# Patient Record
Sex: MALE | Race: WHITE | NOT HISPANIC OR LATINO | Employment: FULL TIME | URBAN - METROPOLITAN AREA
[De-identification: names, ages, dates, MRNs, and addresses within clinical notes are randomized per-mention and may not be internally consistent; named-entity substitution may affect disease eponyms.]

---

## 2024-09-15 ENCOUNTER — HOSPITAL ENCOUNTER (EMERGENCY)
Facility: HOSPITAL | Age: 34
Discharge: HOME | End: 2024-09-15
Attending: EMERGENCY MEDICINE

## 2024-09-15 VITALS
TEMPERATURE: 98.3 F | DIASTOLIC BLOOD PRESSURE: 75 MMHG | OXYGEN SATURATION: 98 % | HEART RATE: 79 BPM | SYSTOLIC BLOOD PRESSURE: 151 MMHG | RESPIRATION RATE: 18 BRPM

## 2024-09-15 DIAGNOSIS — K62.5 RECTAL BLEEDING: Primary | ICD-10-CM

## 2024-09-15 LAB
ANION GAP SERPL CALCULATED.3IONS-SCNC: 9 MMOL/L (ref 4–13)
BASOPHILS # BLD AUTO: 0.03 THOUSANDS/ΜL (ref 0–0.1)
BASOPHILS NFR BLD AUTO: 1 % (ref 0–1)
BUN SERPL-MCNC: 12 MG/DL (ref 5–25)
CALCIUM SERPL-MCNC: 9.1 MG/DL (ref 8.4–10.2)
CHLORIDE SERPL-SCNC: 105 MMOL/L (ref 96–108)
CO2 SERPL-SCNC: 26 MMOL/L (ref 21–32)
CREAT SERPL-MCNC: 0.86 MG/DL (ref 0.6–1.3)
EOSINOPHIL # BLD AUTO: 0.11 THOUSAND/ΜL (ref 0–0.61)
EOSINOPHIL NFR BLD AUTO: 2 % (ref 0–6)
ERYTHROCYTE [DISTWIDTH] IN BLOOD BY AUTOMATED COUNT: 17.5 % (ref 11.6–15.1)
GFR SERPL CREATININE-BSD FRML MDRD: 112 ML/MIN/1.73SQ M
GLUCOSE SERPL-MCNC: 109 MG/DL (ref 65–140)
HCT VFR BLD AUTO: 37.3 % (ref 36.5–49.3)
HGB BLD-MCNC: 10.9 G/DL (ref 12–17)
IMM GRANULOCYTES # BLD AUTO: 0.02 THOUSAND/UL (ref 0–0.2)
IMM GRANULOCYTES NFR BLD AUTO: 0 % (ref 0–2)
LYMPHOCYTES # BLD AUTO: 1.3 THOUSANDS/ΜL (ref 0.6–4.47)
LYMPHOCYTES NFR BLD AUTO: 28 % (ref 14–44)
MCH RBC QN AUTO: 20.2 PG (ref 26.8–34.3)
MCHC RBC AUTO-ENTMCNC: 29.2 G/DL (ref 31.4–37.4)
MCV RBC AUTO: 69 FL (ref 82–98)
MONOCYTES # BLD AUTO: 0.35 THOUSAND/ΜL (ref 0.17–1.22)
MONOCYTES NFR BLD AUTO: 8 % (ref 4–12)
NEUTROPHILS # BLD AUTO: 2.8 THOUSANDS/ΜL (ref 1.85–7.62)
NEUTS SEG NFR BLD AUTO: 61 % (ref 43–75)
NRBC BLD AUTO-RTO: 0 /100 WBCS
PLATELET # BLD AUTO: 311 THOUSANDS/UL (ref 149–390)
PMV BLD AUTO: 10.4 FL (ref 8.9–12.7)
POTASSIUM SERPL-SCNC: 3.7 MMOL/L (ref 3.5–5.3)
RBC # BLD AUTO: 5.39 MILLION/UL (ref 3.88–5.62)
SODIUM SERPL-SCNC: 140 MMOL/L (ref 135–147)
WBC # BLD AUTO: 4.61 THOUSAND/UL (ref 4.31–10.16)

## 2024-09-15 PROCEDURE — 80048 BASIC METABOLIC PNL TOTAL CA: CPT

## 2024-09-15 PROCEDURE — 85025 COMPLETE CBC W/AUTO DIFF WBC: CPT

## 2024-09-15 PROCEDURE — 99284 EMERGENCY DEPT VISIT MOD MDM: CPT | Performed by: EMERGENCY MEDICINE

## 2024-09-15 PROCEDURE — 36415 COLL VENOUS BLD VENIPUNCTURE: CPT

## 2024-09-15 PROCEDURE — 99284 EMERGENCY DEPT VISIT MOD MDM: CPT

## 2024-09-15 NOTE — ED PROVIDER NOTES
1. Rectal bleeding      ED Disposition       ED Disposition   Discharge    Condition   Stable    Date/Time   Sun Sep 15, 2024  3:01 PM    Comment   Bowen Cohen discharge to home/self care.                   Assessment & Plan       Medical Decision Making  3-year-old male presents for evaluation of rectal bleeding.    On initial evaluation, patient in no acute distress, resting comfortably in bed.  Physical exam unremarkable.  Hemoccult negative.  On GI, no anal fissure, external hemorrhoid, or addison bleeding per rectum noted.  On internal exam, no significant internal hemorrhoid appreciated.  CBC, BMP ordered to rule out anemia from rectal bleeding or electrolyte abnormalities.  At this time, main suspicion is for hemorrhoids versus anal fissure vs mass.  Labs significant for hemoglobin 10.9, otherwise unremarkable.  No concern at this time for acute blood loss anemia.  Discussed findings with patient, and recommended follow-up with PCP and gastroenterology.  Referral placed for GI.  Patient in agreement with this plan, stable for dispo.    Amount and/or Complexity of Data Reviewed  Labs: ordered.                     Medications - No data to display    History of Present Illness       HPI    33-year-old male presents for evaluation of bleeding.  Episodes have been present for the past 3 years, but over the past 3 months have been increased.  Reports large amount of blood per rectum after bowel movements, but also recently endorses bleeding with bearing down.  Associated with rectal pain after BM.  Denies straining with BM, and reports softer stools.  He was seen this about 2 years ago in [place], and was diagnosed with hemorrhoids.  Hemorrhoid cream given at that time was helpful in preventing symptoms for about 1.5 months, but eventually recurred.  He has not been evaluated for this afterward.  Denies nausea, vomiting, abdominal pain, fever, chills.    Review of Systems   Constitutional:  Negative for  chills and fever.   HENT:  Negative for ear pain and sore throat.    Eyes:  Negative for pain and visual disturbance.   Respiratory:  Negative for cough and shortness of breath.    Cardiovascular:  Negative for chest pain and palpitations.   Gastrointestinal:  Positive for anal bleeding, blood in stool and rectal pain (After BM). Negative for abdominal pain, constipation, diarrhea, nausea and vomiting.   Genitourinary:  Negative for dysuria and hematuria.   Musculoskeletal:  Negative for arthralgias and back pain.   Skin:  Negative for color change and rash.   Neurological:  Negative for seizures and syncope.   All other systems reviewed and are negative.          Objective     ED Triage Vitals [09/15/24 1218]   Temperature Pulse Blood Pressure Respirations SpO2 Patient Position - Orthostatic VS   98.3 °F (36.8 °C) 79 151/75 18 98 % --      Temp Source Heart Rate Source BP Location FiO2 (%) Pain Score    Oral Monitor Right arm -- --        Physical Exam  Vitals and nursing note reviewed.   Constitutional:       General: He is not in acute distress.     Appearance: He is well-developed.   HENT:      Head: Normocephalic and atraumatic.   Eyes:      Conjunctiva/sclera: Conjunctivae normal.   Cardiovascular:      Rate and Rhythm: Normal rate and regular rhythm.      Heart sounds: No murmur heard.  Pulmonary:      Effort: Pulmonary effort is normal. No respiratory distress.      Breath sounds: Normal breath sounds.   Abdominal:      Palpations: Abdomen is soft.      Tenderness: There is no abdominal tenderness.   Genitourinary:     Rectum: Normal. Guaiac result negative. No anal fissure, external hemorrhoid or internal hemorrhoid. Normal anal tone.   Musculoskeletal:         General: No swelling.      Cervical back: Neck supple.   Skin:     General: Skin is warm and dry.      Capillary Refill: Capillary refill takes less than 2 seconds.   Neurological:      Mental Status: He is alert.   Psychiatric:         Mood and  Affect: Mood normal.         Labs Reviewed   CBC AND DIFFERENTIAL - Abnormal       Result Value    WBC 4.61      RBC 5.39      Hemoglobin 10.9 (*)     Hematocrit 37.3      MCV 69 (*)     MCH 20.2 (*)     MCHC 29.2 (*)     RDW 17.5 (*)     MPV 10.4      Platelets 311      nRBC 0      Segmented % 61      Immature Grans % 0      Lymphocytes % 28      Monocytes % 8      Eosinophils Relative 2      Basophils Relative 1      Absolute Neutrophils 2.80      Absolute Immature Grans 0.02      Absolute Lymphocytes 1.30      Absolute Monocytes 0.35      Eosinophils Absolute 0.11      Basophils Absolute 0.03     BASIC METABOLIC PANEL    Sodium 140      Potassium 3.7      Chloride 105      CO2 26      ANION GAP 9      BUN 12      Creatinine 0.86      Glucose 109      Calcium 9.1      eGFR 112      Narrative:     [employer] guidelines for Chronic Kidney Disease (CKD):     Stage 1 with normal or high GFR (GFR > 90 mL/min/1.73 square meters)    Stage 2 Mild CKD (GFR = 60-89 mL/min/1.73 square meters)    Stage 3A Moderate CKD (GFR = 45-59 mL/min/1.73 square meters)    Stage 3B Moderate CKD (GFR = 30-44 mL/min/1.73 square meters)    Stage 4 Severe CKD (GFR = 15-29 mL/min/1.73 square meters)    Stage 5 End Stage CKD (GFR <15 mL/min/1.73 square meters)  Note: GFR calculation is accurate only with a steady state creatinine     No orders to display       Procedures         Jb Cade MD  09/15/24 6621

## 2024-09-15 NOTE — ED ATTENDING ATTESTATION
9/15/2024  I, Gerry Nguyen MD, saw and evaluated the patient. I have discussed the patient with the resident/non-physician practitioner and agree with the resident's/non-physician practitioner's findings, Plan of Care, and MDM as documented in the resident's/non-physician practitioner's note, except where noted. All available labs and Radiology studies were reviewed.  I was present for key portions of any procedure(s) performed by the resident/non-physician practitioner and I was immediately available to provide assistance.       At this point I agree with the current assessment done in the Emergency Department.  I have conducted an independent evaluation of this patient a history and physical is as follows:    Briefly, 33-year-old male with prior history of hemorrhoids presenting with multiple episodes of bright red blood per rectum with bleeding after bowel movements over the past several days.  He does complain of some fatigue but denies abdominal pain, trauma, fever, chest pain, shortness of breath, numbness, weakness, nausea, vomiting, other symptoms.  On examination, no respiratory distress with normal breathing, normal pulses, abdomen nontender, no masses.  Labs remarkable for mild anemia, patient informed, to be followed up by his primary care doctor.  Also referred to gastroenterology for further workup and care.  Hemodynamically stable and comfortable at time of discharge.  ED Course         Critical Care Time  Procedures

## 2024-10-02 ENCOUNTER — TELEPHONE (OUTPATIENT)
Dept: GASTROENTEROLOGY | Facility: CLINIC | Age: 34
End: 2024-10-02

## 2024-10-02 ENCOUNTER — OFFICE VISIT (OUTPATIENT)
Dept: GASTROENTEROLOGY | Facility: CLINIC | Age: 34
End: 2024-10-02

## 2024-10-02 VITALS — WEIGHT: 188.8 LBS | DIASTOLIC BLOOD PRESSURE: 86 MMHG | SYSTOLIC BLOOD PRESSURE: 129 MMHG | HEART RATE: 78 BPM

## 2024-10-02 DIAGNOSIS — K62.5 RECTAL BLEEDING: ICD-10-CM

## 2024-10-02 PROCEDURE — 99204 OFFICE O/P NEW MOD 45 MIN: CPT | Performed by: INTERNAL MEDICINE

## 2024-10-02 NOTE — PROGRESS NOTES
Ambulatory Visit  Name: Bowen Cohen      : 1990      MRN: [id number]  Encounter Provider: Gerardo Pfeiffer MD  Encounter Date: 10/2/2024   Encounter department: [facility] GASTROENTEROLOGY [address]    Assessment & Plan  Rectal bleeding  Likely hemorrhoidal although differential diagnosis would include other inflammatory neoplastic processes.  Discussed differential diagnosis and will plan further evaluation with colonoscopy.      History of Present Illness     Bowen Cohen is a 33 y.o. male who presents with 2 yrs  intermittent rectal bleeding.  He was recently seen in the emergency room. Exam there was unremarkable.  Reports red blood that occasionally soaks through his clothing.  No associated pain, change in bowel habit, unexplained weight loss.  No family history of gastrointestinal disease.    History obtained from : Patient through   Review of Systems        Objective     /86 (BP Location: Left arm, Patient Position: Sitting, Cuff Size: Standard)   Pulse 78   Wt 85.6 kg (188 lb 12.8 oz)     Physical Exam

## 2024-10-02 NOTE — TELEPHONE ENCOUNTER
Scheduled date of colonoscopy (as of today):12/20/24  Physician performing colonoscopy:Dr Pfeiffer   Location of colonoscopy:   Bowel prep reviewed with patient:mele   Instructions reviewed with patient by:sb  Clearances: none     Pt has no insurance -

## 2025-02-06 ENCOUNTER — TELEPHONE (OUTPATIENT)
Dept: GASTROENTEROLOGY | Facility: CLINIC | Age: 35
End: 2025-02-06